# Patient Record
Sex: MALE | Race: WHITE | Employment: FULL TIME | URBAN - METROPOLITAN AREA
[De-identification: names, ages, dates, MRNs, and addresses within clinical notes are randomized per-mention and may not be internally consistent; named-entity substitution may affect disease eponyms.]

---

## 2019-11-15 ENCOUNTER — HOSPITAL ENCOUNTER (EMERGENCY)
Age: 59
Discharge: HOME | End: 2019-11-15
Attending: EMERGENCY MEDICINE
Payer: COMMERCIAL

## 2019-11-15 VITALS
RESPIRATION RATE: 16 BRPM | TEMPERATURE: 97.4 F | HEART RATE: 95 BPM | DIASTOLIC BLOOD PRESSURE: 83 MMHG | HEIGHT: 70 IN | BODY MASS INDEX: 24.4 KG/M2 | SYSTOLIC BLOOD PRESSURE: 151 MMHG | WEIGHT: 170.42 LBS | OXYGEN SATURATION: 100 %

## 2019-11-15 DIAGNOSIS — S76.911A MUSCLE STRAIN OF RIGHT THIGH, INITIAL ENCOUNTER: Primary | ICD-10-CM

## 2019-11-15 PROCEDURE — 99282 EMERGENCY DEPT VISIT SF MDM: CPT

## 2019-11-15 RX ORDER — NAPROXEN SODIUM 550 MG/1
550 TABLET ORAL 2 TIMES DAILY WITH MEALS
Qty: 30 TABLET | Refills: 0 | Status: SHIPPED | OUTPATIENT
Start: 2019-11-15 | End: 2020-03-30

## 2019-11-15 ASSESSMENT — ENCOUNTER SYMPTOMS
SORE THROAT: 0
SHORTNESS OF BREATH: 0
EYE PAIN: 0
DIARRHEA: 0
EYE REDNESS: 0
COUGH: 0
BACK PAIN: 0
WHEEZING: 0
NAUSEA: 0
VOMITING: 0
EYE DISCHARGE: 0
ABDOMINAL PAIN: 0
RHINORRHEA: 0

## 2019-11-15 ASSESSMENT — PAIN SCALES - GENERAL
PAINLEVEL_OUTOF10: 2
PAINLEVEL_OUTOF10: 2

## 2019-11-15 ASSESSMENT — PAIN DESCRIPTION - PAIN TYPE: TYPE: ACUTE PAIN

## 2019-11-15 ASSESSMENT — PAIN DESCRIPTION - LOCATION: LOCATION: LEG

## 2019-11-15 ASSESSMENT — PAIN DESCRIPTION - DESCRIPTORS: DESCRIPTORS: ACHING

## 2019-11-15 ASSESSMENT — PAIN DESCRIPTION - ORIENTATION: ORIENTATION: RIGHT;UPPER

## 2020-03-30 ENCOUNTER — APPOINTMENT (OUTPATIENT)
Dept: GENERAL RADIOLOGY | Age: 60
End: 2020-03-30
Payer: COMMERCIAL

## 2020-03-30 ENCOUNTER — HOSPITAL ENCOUNTER (EMERGENCY)
Age: 60
Discharge: HOME | End: 2020-03-30
Attending: EMERGENCY MEDICINE
Payer: COMMERCIAL

## 2020-03-30 VITALS
DIASTOLIC BLOOD PRESSURE: 79 MMHG | BODY MASS INDEX: 25.82 KG/M2 | TEMPERATURE: 97.5 F | RESPIRATION RATE: 15 BRPM | OXYGEN SATURATION: 100 % | HEART RATE: 81 BPM | WEIGHT: 180.34 LBS | HEIGHT: 70 IN | SYSTOLIC BLOOD PRESSURE: 140 MMHG

## 2020-03-30 PROCEDURE — 99283 EMERGENCY DEPT VISIT LOW MDM: CPT

## 2020-03-30 PROCEDURE — 12002 RPR S/N/AX/GEN/TRNK2.6-7.5CM: CPT

## 2020-03-30 PROCEDURE — 11740 EVACUATION SUBUNGUAL HMTMA: CPT

## 2020-03-30 PROCEDURE — 6360000002 HC RX W HCPCS: Performed by: EMERGENCY MEDICINE

## 2020-03-30 PROCEDURE — 73130 X-RAY EXAM OF HAND: CPT

## 2020-03-30 PROCEDURE — 90715 TDAP VACCINE 7 YRS/> IM: CPT | Performed by: EMERGENCY MEDICINE

## 2020-03-30 PROCEDURE — 90471 IMMUNIZATION ADMIN: CPT | Performed by: EMERGENCY MEDICINE

## 2020-03-30 RX ORDER — LIDOCAINE HYDROCHLORIDE AND EPINEPHRINE 10; 10 MG/ML; UG/ML
20 INJECTION, SOLUTION INFILTRATION; PERINEURAL ONCE
Status: DISCONTINUED | OUTPATIENT
Start: 2020-03-30 | End: 2020-03-30 | Stop reason: HOSPADM

## 2020-03-30 RX ADMIN — TETANUS TOXOID, REDUCED DIPHTHERIA TOXOID AND ACELLULAR PERTUSSIS VACCINE, ADSORBED 0.5 ML: 5; 2.5; 8; 8; 2.5 SUSPENSION INTRAMUSCULAR at 14:31

## 2020-03-30 ASSESSMENT — PAIN DESCRIPTION - LOCATION: LOCATION: HAND

## 2020-03-30 ASSESSMENT — PAIN DESCRIPTION - FREQUENCY: FREQUENCY: CONTINUOUS

## 2020-03-30 ASSESSMENT — PAIN DESCRIPTION - ORIENTATION: ORIENTATION: LEFT

## 2020-03-30 ASSESSMENT — PAIN SCALES - GENERAL: PAINLEVEL_OUTOF10: 4

## 2020-03-30 ASSESSMENT — PAIN DESCRIPTION - PAIN TYPE: TYPE: ACUTE PAIN

## 2020-03-30 ASSESSMENT — PAIN DESCRIPTION - DESCRIPTORS: DESCRIPTORS: THROBBING

## 2020-03-30 NOTE — ED PROVIDER NOTES
Emergency Physician Note    Chief Complaint  Hand Injury (left hand injury, a metal shelf fell on hand, laceration over index and middle finger knuckle)       History of Present Illness  Dakota Armenta is a 60 y.o. male who presents to the ED for an injury. Patient reports he was pushing a pallet kaylyn when he went through a doorway and a metal shelf fell on his hand. This injured his left hand. Tetanus is not up-to-date. Bleeding controlled prior to arrival.  He denies any other injuries other than the injury to his left hand. He has pain primarily in the thumb. 10 systems reviewed, pertinent positives per HPI otherwise noted to be negative    I have reviewed the following from the nursing documentation:      Prior to Admission medications    Not on File       Allergies as of 03/30/2020    (No Known Allergies)       History reviewed. No pertinent past medical history. Surgical History:   Past Surgical History:   Procedure Laterality Date    COLONOSCOPY          Family History:  History reviewed. No pertinent family history.     Social History     Socioeconomic History    Marital status:      Spouse name: Not on file    Number of children: Not on file    Years of education: Not on file    Highest education level: Not on file   Occupational History    Not on file   Social Needs    Financial resource strain: Not on file    Food insecurity     Worry: Not on file     Inability: Not on file    Transportation needs     Medical: Not on file     Non-medical: Not on file   Tobacco Use    Smoking status: Current Every Day Smoker     Packs/day: 0.50    Smokeless tobacco: Never Used   Substance and Sexual Activity    Alcohol use: Yes     Comment: very rarely    Drug use: Never    Sexual activity: Yes     Partners: Female   Lifestyle    Physical activity     Days per week: Not on file     Minutes per session: Not on file    Stress: Not on file   Relationships    Social connections     Talks on phone: Not on file     Gets together: Not on file     Attends [employer] service: Not on file     Active member of club or organization: Not on file     Attends meetings of clubs or organizations: Not on file     Relationship status: Not on file    Intimate partner violence     Fear of current or ex partner: Not on file     Emotionally abused: Not on file     Physically abused: Not on file     Forced sexual activity: Not on file   Other Topics Concern    Not on file   Social History Narrative    Not on file       Nursing notes reviewed. ED Triage Vitals [03/30/20 1418]   Enc Vitals Group      BP (!) 168/99      Pulse 97      Resp 15      Temp 97.5 °F (36.4 °C)      Temp Source Oral      SpO2 100 %      Weight 180 lb 5.4 oz (81.8 kg)      Height 5' 10\" (1.778 m)      Head Circumference       Peak Flow       Pain Score       Pain Loc       Pain Edu? Excl. in [address]? GENERAL:  Awake, alert. Well developed, well nourished with no apparent distress. HENT:  Normocephalic, Atraumatic, moist mucous membranes. BACK:  No tenderness. EXTREMITIES:  Normal range of motion, no edema, no bony tenderness, no deformity, distal pulses present. Subungual hematoma noted including the proximal 20% of the left thumbnail. Laceration over the dorsum of the hand overlying the distal portion of the metacarpals primarily the index and middle metacarpals just proximal to the MCP joints. SKIN: Warm, dry and intact. NEUROLOGIC: Normal mental status. Moving all extremities to command. RADIOLOGY  X-RAYS:  I have reviewed radiologic plain film image(s). ALL OTHER NON-PLAIN FILM IMAGES SUCH AS CT, ULTRASOUND AND MRI HAVE BEEN READ BY THE RADIOLOGIST. XR HAND LEFT (MIN 3 VIEWS)   Final Result   No acute osseous abnormality. No foreign body identified            PROCEDURES  Subungual hematoma drained using an 18-gauge needle with no anesthesia necessary and no pain reported by the patient.     PROCEDURE: LACERATION REPAIR  Julia Griffin or their surrogate had an opportunity to ask questions, and the risks, benefits, and alternatives were discussed. The wound was prepped and draped to maintain a sterile field. A local anesthetic was used to completely anesthetize the wound. It was copiously irrigated. It was explored to its depth in a bloodless field with no sign of tendon, nerve, or vascular injury. No foreign bodies were identified. It was closed with a subcutaneous layer of 5-0 Vicryl in a running stitch followed by 9 interrupted stitches of 4-0 Ethilon at the skin layer. Total length of laceration(s) is 4cm. There were no complications during the procedure. MEDICAL DECISION MAKING      I advised the patient to return to the emergency department immediately for any new or worsening symptoms, such as swelling, redness or drainage from the wound. The patient voiced agreement and understanding of the treatment plan. No results found for this visit on 03/30/20. I estimate there is LOW risk for CELLULITIS, COMPARTMENT SYNDROME, NECROTIZING FASCIITIS, TENDON OR NEUROVASCULAR INJURY, or FOREIGN BODY, thus I consider the discharge disposition reasonable. Also, there is no evidence or peritonitis, sepsis, or toxicity. Juliajohn Griffin and I have discussed the diagnosis and risks, and we agree with discharging home to follow-up with their primary doctor. We also discussed returning to the Emergency Department immediately if new or worsening symptoms occur. We have discussed the symptoms which are most concerning (e.g., changing or worsening pain, fever, numbness, weakness, cool or painful digits) that necessitate immediate return. Final Impression    1. Laceration of left hand without foreign body, initial encounter    2. Subungual hematoma of fingernail, initial encounter    3.  Contusion of left hand, initial encounter        Discharge Vital Signs:  Blood pressure (!) 140/79, pulse 81, temperature 97.5

## 2020-09-29 ENCOUNTER — HOSPITAL ENCOUNTER (EMERGENCY)
Age: 60
Discharge: HOME | End: 2020-09-29
Attending: EMERGENCY MEDICINE
Payer: COMMERCIAL

## 2020-09-29 ENCOUNTER — APPOINTMENT (OUTPATIENT)
Dept: CT IMAGING | Age: 60
End: 2020-09-29
Payer: COMMERCIAL

## 2020-09-29 VITALS
DIASTOLIC BLOOD PRESSURE: 82 MMHG | HEART RATE: 86 BPM | TEMPERATURE: 98.6 F | RESPIRATION RATE: 19 BRPM | OXYGEN SATURATION: 99 % | SYSTOLIC BLOOD PRESSURE: 141 MMHG

## 2020-09-29 PROCEDURE — 70450 CT HEAD/BRAIN W/O DYE: CPT

## 2020-09-29 PROCEDURE — 99284 EMERGENCY DEPT VISIT MOD MDM: CPT

## 2020-09-29 PROCEDURE — 6370000000 HC RX 637 (ALT 250 FOR IP): Performed by: EMERGENCY MEDICINE

## 2020-09-29 RX ORDER — BACITRACIN, NEOMYCIN, POLYMYXIN B 400; 3.5; 5 [USP'U]/G; MG/G; [USP'U]/G
OINTMENT TOPICAL ONCE
Status: COMPLETED | OUTPATIENT
Start: 2020-09-29 | End: 2020-09-29

## 2020-09-29 RX ADMIN — BACITRACIN ZINC, NEOMYCIN SULFATE, AND POLYMYXIN B SULFATE: 400; 3.5; 5 OINTMENT TOPICAL at 17:55

## 2020-09-29 ASSESSMENT — PAIN DESCRIPTION - ONSET: ONSET: SUDDEN

## 2020-09-29 ASSESSMENT — PAIN SCALES - GENERAL
PAINLEVEL_OUTOF10: 2
PAINLEVEL_OUTOF10: 1

## 2020-09-29 ASSESSMENT — PAIN DESCRIPTION - LOCATION
LOCATION: HEAD
LOCATION: HEAD

## 2020-09-29 ASSESSMENT — PAIN DESCRIPTION - DESCRIPTORS
DESCRIPTORS: HEADACHE;ACHING
DESCRIPTORS: HEADACHE

## 2020-09-29 ASSESSMENT — PAIN DESCRIPTION - FREQUENCY: FREQUENCY: CONTINUOUS

## 2020-09-29 ASSESSMENT — PAIN DESCRIPTION - PROGRESSION: CLINICAL_PROGRESSION: GRADUALLY IMPROVING
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
## 2020-09-29 NOTE — ED TRIAGE NOTES
Pt presents to ED via PV with c/o of a head injury at work due to metal object falling on head. Small laceration noted to top of head with hematoma. Bleeding controlled. Denies blood thinners. Denies LOC. Resp even and unlabored. A/ox4. No acute distress noted. Denies any need at this time. Call light within reach. Bed in lowest position. Will continue to monitor.

## 2020-09-29 NOTE — ED NOTES
Wound cleaned. Pt discharged from ED to home. Pt verbalizes understanding to discharge instructions, teach back successful. Pt denies questions at this time. No acute distress noted. Resp even and unlabored. A/ox4. Pt instructed to follow-up as noted - return to ED if symptoms worsen. Pt verbalizes understanding. Discharged per ED MD with discharge instructions. Pt refuses ambulatory assistance to lobby and walks with steady gait.         Naomy Salcedo RN  09/29/20 1732

## 2024-01-09 ENCOUNTER — HOSPITAL ENCOUNTER (OUTPATIENT)
Dept: PHYSICAL THERAPY | Age: 64
Setting detail: THERAPIES SERIES
Discharge: HOME | End: 2024-01-09
Payer: COMMERCIAL

## 2024-01-09 DIAGNOSIS — M62.81 MUSCLE WEAKNESS OF LEFT UPPER EXTREMITY: ICD-10-CM

## 2024-01-09 DIAGNOSIS — M25.512 LEFT SHOULDER PAIN, UNSPECIFIED CHRONICITY: Primary | ICD-10-CM

## 2024-01-09 DIAGNOSIS — R26.89 DECREASED FUNCTIONAL MOBILITY: ICD-10-CM

## 2024-01-09 PROCEDURE — 97110 THERAPEUTIC EXERCISES: CPT | Performed by: PHYSICAL THERAPIST

## 2024-01-09 PROCEDURE — 97161 PT EVAL LOW COMPLEX 20 MIN: CPT | Performed by: PHYSICAL THERAPIST

## 2024-01-09 NOTE — PLAN OF CARE
[facility]- Outpatient Rehabilitation and Therapy [address], [place] [address] office: [phone] fax: [phone]     Physical Therapy Certification      Dear Gianni Serna MD,    We had the pleasure of evaluating the following patient for physical therapy services at [facility].  A summary of our findings can be found in the initial assessment below.  This includes our plan of care.  If you have any questions or concerns regarding these findings, please do not hesitate to contact me at the office phone number listed above.  Thank you for the referral.     Physician Signature:_______________________________Date:__________________  By signing above (or electronic signature), therapist’s plan is approved by physician       Physical Therapy: Initial Evaluation   Patient: Jeferson Sosa (63 y.o. male)   Examination Date: 2024   :  1960 MRN: [id number]   Visit #: 1    Insurance: Payor: KINDRA BAKER / Plan: KINDRA BAKER / Product Type: *No Product type* /   Insurance ID: [id number] - (Worker's Comp)  Secondary Insurance (if applicable):    Treatment Diagnosis:   Strain of muscle, fascia and tendon of long head of biceps, left arm, initial encounter [S46.112A]     Medical Diagnosis:  Strain of muscle, fascia and tendon of long head of biceps, left arm, initial encounter [S46.112A]   Referring Physician: Gianni Serna MD  PCP: Edgardo Donnelly MD                              Precautions/ Contra-indications:           Latex allergy:  NO  Pacemaker:    NO  Contraindications for Manipulation: None      Red Flags:  None    C-SSRS Triggered by Intake questionnaire:   [x] No, Questionnaire did not trigger screening.   [] Yes, Patient intake triggered further evaluation      [] C-SSRS Screening completed  [] PCP notified via Plan of Care  [] Emergency services notified     Preferred Language for Healthcare:   [x] English       []

## 2024-01-12 ENCOUNTER — HOSPITAL ENCOUNTER (OUTPATIENT)
Dept: PHYSICAL THERAPY | Age: 64
Setting detail: THERAPIES SERIES
Discharge: HOME | End: 2024-01-12
Payer: COMMERCIAL

## 2024-01-12 PROCEDURE — 97110 THERAPEUTIC EXERCISES: CPT | Performed by: PHYSICAL THERAPIST

## 2024-01-12 NOTE — FLOWSHEET NOTE
to help  facilitate improvement in movement, function, and ADLs as indicated by functional deficits.              Status: [] Progressing: [] Met: [] Not Met: [] Adjusted     Long Term Goals: To be achieved in: 4-6 weeks  Disability index score of 10% or less for the Upper Extremity Functional Scale to assist with return to prior level of function.                      Status: [] Progressing: [] Met: [] Not Met: [] Adjusted  Improve shoulder AROM to WFL to allow for proper joint functioning as indicated by patients functional deficits.  Status: [] Progressing: [] Met: [] Not Met: [] Adjusted  Pt to improve strength to 4+/5 or better of rotator cuff and shoulder elevators to allow for proper muscle and joint use in functional mobility, ADLs and prior level of function   Status: [] Progressing: [] Met: [] Not Met: [] Adjusted  Patient will return to Reaching activities, Dressing, and Lifting without increased symptoms or restriction to work towards return to prior level of function.                                    Status: [] Progressing: [] Met: [] Not Met: [] Adjusted  Patient will increase UE function to return to work with regular duties.                                                                                                                 Status: [] Progressing: [] Met: [] Not Met: [] Adjusted    Overall Progression Towards Functional goals/ Treatment Progress Update:  [] Patient is progressing as expected towards functional goals listed.    [] Progression is slowed due to complexities/Impairments listed.  [] Progression has been slowed due to co-morbidities.  [x] Plan just implemented, too soon (<30days) to assess goals progression   [] Goals require adjustment due to lack of progress  [] Patient is not progressing as expected and requires additional follow up with physician  [] Other:     CHARGE CAPTURE     PT WORKERS COMP GRID:   CPT Code (TIMED) # Time In Time Out Total Time CPT Code (UNTIMED) #

## 2024-01-16 ENCOUNTER — HOSPITAL ENCOUNTER (OUTPATIENT)
Dept: PHYSICAL THERAPY | Age: 64
Discharge: HOME | End: 2024-01-16
Payer: COMMERCIAL

## 2024-01-16 PROCEDURE — 97110 THERAPEUTIC EXERCISES: CPT | Performed by: PHYSICAL THERAPIST

## 2024-01-16 PROCEDURE — 97112 NEUROMUSCULAR REEDUCATION: CPT | Performed by: PHYSICAL THERAPIST

## 2024-01-16 NOTE — FLOWSHEET NOTE
of rotator cuff and shoulder elevators to allow for proper muscle and joint use in functional mobility, ADLs and prior level of function   Status: [] Progressing: [] Met: [] Not Met: [] Adjusted  Patient will return to Reaching activities, Dressing, and Lifting without increased symptoms or restriction to work towards return to prior level of function.                                    Status: [] Progressing: [] Met: [] Not Met: [] Adjusted  Patient will increase UE function to return to work with regular duties.                                                                                                                 Status: [] Progressing: [] Met: [] Not Met: [] Adjusted    Overall Progression Towards Functional goals/ Treatment Progress Update:  [] Patient is progressing as expected towards functional goals listed.    [] Progression is slowed due to complexities/Impairments listed.  [] Progression has been slowed due to co-morbidities.  [x] Plan just implemented, too soon (<30days) to assess goals progression   [] Goals require adjustment due to lack of progress  [] Patient is not progressing as expected and requires additional follow up with physician  [] Other:     CHARGE CAPTURE     PT WORKERS COMP GRID:   CPT Code (TIMED) # Time In Time Out Total Time CPT Code (UNTIMED) #    Therex (15155)  2 105 135 30  EVAL:LOW (55019 - Typically 20 minutes face-to-face)     Neuromusc. Re-ed (22285) 1 135 148 13  Re-Eval (92004)     Manual (59203)      Estim Unattended (19303)     Ther. Act (60585)      Mech. Traction (14961)     Gait (00402)      Dry Needle 1-2 muscle (61246)     Aquatic Therex (61238)      Dry Needle 3+ muscle (20561)     Iontophoresis (56708)      VASO (82199)     Ultrasound (33873)      Group Therapy (94765)     Estim Attended (83645)      Canalith Repositioning (48391)     Other:      Other:    Totals   2 105 148 43       Total Treatment Minutes 43       Charge Justification:  (32191) THERAPEUTIC

## 2024-01-19 ENCOUNTER — HOSPITAL ENCOUNTER (OUTPATIENT)
Dept: PHYSICAL THERAPY | Age: 64
Setting detail: THERAPIES SERIES
Discharge: HOME | End: 2024-01-19
Payer: COMMERCIAL

## 2024-01-19 PROCEDURE — 97110 THERAPEUTIC EXERCISES: CPT | Performed by: PHYSICAL THERAPIST

## 2024-01-19 PROCEDURE — 97112 NEUROMUSCULAR REEDUCATION: CPT | Performed by: PHYSICAL THERAPIST

## 2024-01-19 NOTE — FLOWSHEET NOTE
following either an injury or surgery.   (76574) HOME EXERCISE PROGRAM - Reviewed/Progressed HEP activities related to strengthening, flexibility, endurance, ROM performed to prevent loss of range of motion, maintain or improve muscular strength or increase flexibility, following either an injury or surgery.  (91953) NEUROMUSCULAR RE-EDUCATION - Therapeutic procedure, 1 or more areas, each 15 minutes; neuromuscular reeducation of movement, balance, coordination, kinesthetic sense, posture, and/or proprioception for sitting and/or standing activities  (05540) HOME EXERCISE PROGRAM - Reviewed/Progressed HEP activities related to neuromuscular reeducation of movement, balance, coordination, kinesthetic sense, posture, and/or proprioception for sitting and/or standing activities    (15013) THERAPEUTIC ACTIVITY - use of dynamic activities to improve functional performance. (Ex include squatting, ascending/descending stairs, walking, bending, lifting, catching, throwing, pushing, pulling, jumping.)  Direct, one on one contact, billed in 15-minute increments.  (73423) MANUAL THERAPY -  Manual therapy techniques, 1 or more regions, each 15 minutes (Mobilization/manipulation, manual lymphatic drainage, manual traction) for the purpose of modulating pain, promoting relaxation,  increasing ROM, reducing/eliminating soft tissue swelling/inflammation/restriction, improving soft tissue extensibility and allowing for proper ROM for normal function with self care, mobility, lifting and ambulation    TREATMENT PLAN   Plan:  may return to [facility] as needed.     Electronically Signed by Nellie Colunga, PT              Date: 01/19/2024     Note: If patient does not return for scheduled/recommended follow up visits, this note will serve as a discharge from care along with the most recent update on progress.

## 2024-01-24 ENCOUNTER — HOSPITAL ENCOUNTER (OUTPATIENT)
Dept: PHYSICAL THERAPY | Age: 64
Setting detail: THERAPIES SERIES
Discharge: HOME | End: 2024-01-24
Payer: COMMERCIAL

## 2024-01-24 PROCEDURE — 97112 NEUROMUSCULAR REEDUCATION: CPT

## 2024-01-24 PROCEDURE — 97110 THERAPEUTIC EXERCISES: CPT

## 2024-01-24 PROCEDURE — 97140 MANUAL THERAPY 1/> REGIONS: CPT

## 2024-01-24 NOTE — FLOWSHEET NOTE
activity limitations and would benefit from continued outpatient therapy services to address the deficits outlined in the patients goals  The patient has a musculoskeletal condition(s) with a corresponding ICD-10 code that is of complexity and severity that require skilled therapeutic intervention. This has a direct and significant impact on the need for therapy and significantly impacts the rate of recovery.     Treatment/Activity Tolerance:  [x] Patient tolerated treatment well [] Patient limited by fatique  [] Patient limited by pain  [] Patient limited by other medical complications  [] Other:     Return to Play: NA    Prognosis for POC: [x] Good [] Fair  [] Poor    Patient requires continued skilled intervention: [x] Yes  [] No      GOALS     Patient stated goal: pain-free normal activities and lifting and return to full duty at work  Status: [] Progressing: [] Met: [] Not Met: [] Adjusted     Therapist goals for Patient:   Short Term Goals: To be achieved in: 2 weeks  Independent in HEP and progression per patient tolerance, in order to progress toward full function and prevent re-injury.               Status: [] Progressing: [] Met: [] Not Met: [] Adjusted  Patient will have a decrease in pain to 0/10 to help  facilitate improvement in movement, function, and ADLs as indicated by functional deficits.              Status: [] Progressing: [] Met: [] Not Met: [] Adjusted     Long Term Goals: To be achieved in: 4-6 weeks  Disability index score of 10% or less for the Upper Extremity Functional Scale to assist with return to prior level of function.                      Status: [] Progressing: [] Met: [] Not Met: [] Adjusted  Improve shoulder AROM to WFL to allow for proper joint functioning as indicated by patients functional deficits.  Status: [] Progressing: [] Met: [] Not Met: [] Adjusted  Pt to improve strength to 4+/5 or better of rotator cuff and shoulder elevators to allow for proper muscle and joint use

## 2024-01-29 ENCOUNTER — APPOINTMENT (OUTPATIENT)
Dept: PHYSICAL THERAPY | Age: 64
End: 2024-01-29
Payer: COMMERCIAL

## 2024-02-01 ENCOUNTER — HOSPITAL ENCOUNTER (OUTPATIENT)
Dept: PHYSICAL THERAPY | Age: 64
Setting detail: THERAPIES SERIES
Discharge: HOME | End: 2024-02-01
Payer: COMMERCIAL

## 2024-02-01 PROCEDURE — 97110 THERAPEUTIC EXERCISES: CPT | Performed by: PHYSICAL THERAPIST

## 2024-02-01 PROCEDURE — 97112 NEUROMUSCULAR REEDUCATION: CPT | Performed by: PHYSICAL THERAPIST

## 2024-02-01 NOTE — PLAN OF CARE
Scale 63/21.25% limit  80, 0% limit   Other:                OBJECTIVE EXAMINATION     Observation:     Test measurements: 2/1:    ROM/Strength: (Blank cells denote NT)     Mvmt (norm) AROM L-2/1 AROM R Notes PROM L PROM R Notes         SHOULDER Flexion (180) WNL              Abduction (180) WNL              ER -0                ER -90 (90)                IR -0                IR -90 (70)                                    ELBOW Flex/biceps (140) 143 140             Ext/triceps (0) 0 Lacking 8            Pronation (80) 84  80            Supination (80) 100  100                  MMT L-2/1 MMT R Notes                     SHOULDER Flexion          Abduction          ER -0          ER -90          IR -0          IR -90                        ELBOW Flex/biceps 5 5      Ext/triceps 5 5      Pronation 5 5      supination 5 5                       WRIST Flexion 5 5      Extension 5 5      RD          UD                       Palpation:   No longer tender to palpation throughout biceps       Special Tests:  [] None Assessed   [] Following tests noted:    Speed's test (LHB): negative  Yergason's test: negative     Exercises/Interventions:       Exercise/Equipment Resistance/Repetitions Other comments   Stretches     Wrist flexors 5 x 10 \" with elbow straight    Wrist extensors 5x 10 \" with elbow straight    Supination    Pronation    Radial deviation     UE ranger x   Wall slide 1   Pec stretch 3 x 30\"    Bicep stretch 5 x 10\"- pronation and shoulder extension    Ulnar deviation     Elbow flexion     Elbow extension     Bear claw     Roof top     Median nerve glide     Ulnar nerve glide     Radial nerve glide     Strengthening     Wrist flexor 3 x 10 4#    Wrist extensor 3 x 10 4#    Supination/pronation 3x 10 3#    Standing scaption 3 x 10 3#    Radial deviation     Ulnar deviation     Elbow flexion - neutral  3 x 10 4#    Elbow flexion- supinated 3 x 10 4#, slow eccentric    Elbow extension 3 x 10 orange TB

## 2024-02-05 ENCOUNTER — HOSPITAL ENCOUNTER (OUTPATIENT)
Dept: PHYSICAL THERAPY | Age: 64
Setting detail: THERAPIES SERIES
Discharge: HOME | End: 2024-02-05
Payer: COMMERCIAL

## 2024-02-05 PROCEDURE — 97110 THERAPEUTIC EXERCISES: CPT

## 2024-02-05 PROCEDURE — 97112 NEUROMUSCULAR REEDUCATION: CPT

## 2024-02-05 NOTE — FLOWSHEET NOTE
[facility]- Outpatient Rehabilitation and Therapy [address] [place] [address] office: [phone] fax: [phone]      Physical Therapy: TREATMENT/PROGRESS NOTE   Patient: Jeefrson Sosa (63 y.o. male)   Treatment Date: 2024   :  1960 MRN: [id number]   Visit #: 7   Insurance Allowable Auth Needed   BWC- 2x/week for 4 weeks (8 visits) []Yes    []No    Insurance: Payor: KINDRA BAKER / Plan: KINDRA BAKER / Product Type: *No Product type* /   Insurance ID: [id number] - (Worker's Comp)  Secondary Insurance (if applicable):    Treatment Diagnosis:   Strain of muscle, fascia and tendon of long head of biceps, left arm, initial encounter [S46.112A]     Medical Diagnosis:    Strain of muscle, fascia and tendon of long head of biceps, left arm, initial encounter [S46.112A]   Referring Physician: Gianni Serna MD  PCP: Edgardo Donnelly MD                             Plan of care signed (Y/N): Y    Date of Patient follow up with Physician: 24     Progress Report/POC: NO  POC update due: (10 visits /OR AUTH LIMITS, whichever is less)  3/1/2024     Relevant Medical History: HTN - controlled with meds; HLD     Preferred Language for Healthcare:   [x]English       []other:    SUBJECTIVE EXAMINATION     Patient Report/Comments: pt saw MD for his biceps yesterday. He is getting sent for a MRI as thinks may be a tendon/ligament more than a muscle. He will see him in February after this. His arm aches more when he just sits. Pt was a little sore after last session, could feel it.   : Doing pretty good. Just has a nagging soreness/pain. Feeling it a bit by his wrist. Has been tolerating therapy well  : arm feels good. Has been doing exercises at home. Has been doing more activity with lifting and pulling stuff and turning with arm at work starting at Monday. It has been feeling better doing more. Pain at worst in last couple days 1-2/10. Sometimes still feels his